# Patient Record
Sex: FEMALE | Race: WHITE | NOT HISPANIC OR LATINO | Employment: OTHER | URBAN - METROPOLITAN AREA
[De-identification: names, ages, dates, MRNs, and addresses within clinical notes are randomized per-mention and may not be internally consistent; named-entity substitution may affect disease eponyms.]

---

## 2023-06-01 LAB
ALANINE AMINOTRANSFERASE (SGPT) (U/L) IN SER/PLAS: 13 U/L (ref 7–45)
ALBUMIN (G/DL) IN SER/PLAS: 4.1 G/DL (ref 3.4–5)
ALKALINE PHOSPHATASE (U/L) IN SER/PLAS: 77 U/L (ref 33–136)
ANION GAP IN SER/PLAS: 9 MMOL/L (ref 10–20)
ASPARTATE AMINOTRANSFERASE (SGOT) (U/L) IN SER/PLAS: 18 U/L (ref 9–39)
BILIRUBIN TOTAL (MG/DL) IN SER/PLAS: 0.9 MG/DL (ref 0–1.2)
CALCIUM (MG/DL) IN SER/PLAS: 10 MG/DL (ref 8.6–10.3)
CARBON DIOXIDE, TOTAL (MMOL/L) IN SER/PLAS: 32 MMOL/L (ref 21–32)
CHLORIDE (MMOL/L) IN SER/PLAS: 104 MMOL/L (ref 98–107)
CREATININE (MG/DL) IN SER/PLAS: 0.96 MG/DL (ref 0.5–1.05)
GFR FEMALE: 58 ML/MIN/1.73M2
GLUCOSE (MG/DL) IN SER/PLAS: 83 MG/DL (ref 74–99)
POTASSIUM (MMOL/L) IN SER/PLAS: 4.2 MMOL/L (ref 3.5–5.3)
PROTEIN TOTAL: 6.8 G/DL (ref 6.4–8.2)
SODIUM (MMOL/L) IN SER/PLAS: 141 MMOL/L (ref 136–145)
THYROTROPIN (MIU/L) IN SER/PLAS BY DETECTION LIMIT <= 0.05 MIU/L: 0.41 MIU/L (ref 0.44–3.98)
THYROXINE (T4) FREE (NG/DL) IN SER/PLAS: 0.85 NG/DL (ref 0.61–1.12)
UREA NITROGEN (MG/DL) IN SER/PLAS: 21 MG/DL (ref 6–23)

## 2023-06-13 LAB — THYROID STIMULATING IMMUNOGLOBULIN: <1 TSI INDEX

## 2023-10-04 ENCOUNTER — TELEPHONE (OUTPATIENT)
Dept: ENDOCRINOLOGY | Facility: CLINIC | Age: 88
End: 2023-10-04

## 2024-07-29 ENCOUNTER — TELEPHONE (OUTPATIENT)
Dept: ENDOCRINOLOGY | Facility: CLINIC | Age: 89
End: 2024-07-29
Payer: MEDICARE

## 2024-07-29 DIAGNOSIS — E03.8 SUBCLINICAL HYPOTHYROIDISM: Primary | ICD-10-CM

## 2024-07-29 NOTE — TELEPHONE ENCOUNTER
Stoney has an appt. 8-6-24 and would like to do her labs before.  Can you please put them in her chart.  Thank you

## 2024-07-31 ENCOUNTER — LAB (OUTPATIENT)
Dept: LAB | Facility: LAB | Age: 89
End: 2024-07-31
Payer: MEDICARE

## 2024-07-31 DIAGNOSIS — E03.8 SUBCLINICAL HYPOTHYROIDISM: ICD-10-CM

## 2024-07-31 LAB
T4 FREE SERPL-MCNC: 0.89 NG/DL (ref 0.61–1.12)
TSH SERPL-ACNC: 0.45 MIU/L (ref 0.44–3.98)

## 2024-07-31 PROCEDURE — 84443 ASSAY THYROID STIM HORMONE: CPT

## 2024-07-31 PROCEDURE — 36415 COLL VENOUS BLD VENIPUNCTURE: CPT

## 2024-07-31 PROCEDURE — 84439 ASSAY OF FREE THYROXINE: CPT

## 2024-08-06 ENCOUNTER — APPOINTMENT (OUTPATIENT)
Dept: ENDOCRINOLOGY | Facility: CLINIC | Age: 89
End: 2024-08-06
Payer: MEDICARE

## 2024-08-06 VITALS — SYSTOLIC BLOOD PRESSURE: 150 MMHG | BODY MASS INDEX: 25.51 KG/M2 | DIASTOLIC BLOOD PRESSURE: 62 MMHG | WEIGHT: 144 LBS

## 2024-08-06 DIAGNOSIS — E05.20 TOXIC ADENOMATOUS GOITER: Primary | ICD-10-CM

## 2024-08-06 DIAGNOSIS — E78.5 HYPERLIPIDEMIA, UNSPECIFIED HYPERLIPIDEMIA TYPE: ICD-10-CM

## 2024-08-06 PROCEDURE — 1159F MED LIST DOCD IN RCRD: CPT | Performed by: HOSPITALIST

## 2024-08-06 PROCEDURE — 99214 OFFICE O/P EST MOD 30 MIN: CPT | Performed by: HOSPITALIST

## 2024-08-06 RX ORDER — PRAVASTATIN SODIUM 40 MG/1
TABLET ORAL
COMMUNITY
Start: 2018-07-13

## 2024-08-06 RX ORDER — POTASSIUM CHLORIDE 600 MG/1
TABLET, FILM COATED, EXTENDED RELEASE ORAL
COMMUNITY
Start: 2018-07-13

## 2024-08-06 RX ORDER — METOPROLOL TARTRATE 50 MG/1
TABLET ORAL
COMMUNITY
Start: 2018-07-13

## 2024-08-06 RX ORDER — LOSARTAN POTASSIUM AND HYDROCHLOROTHIAZIDE 12.5; 1 MG/1; MG/1
TABLET ORAL
COMMUNITY
Start: 2018-07-13

## 2024-08-06 ASSESSMENT — ENCOUNTER SYMPTOMS
NAUSEA: 0
ABDOMINAL DISTENTION: 0
PALPITATIONS: 0
VOMITING: 0
TREMORS: 0
EYE ITCHING: 0
CONSTIPATION: 0
ARTHRALGIAS: 0
DYSURIA: 0
ABDOMINAL PAIN: 0
CHEST TIGHTNESS: 0
LIGHT-HEADEDNESS: 0
TROUBLE SWALLOWING: 0
FREQUENCY: 0
HEADACHES: 0
SHORTNESS OF BREATH: 0
NERVOUS/ANXIOUS: 0
SORE THROAT: 0
VOICE CHANGE: 0
PHOTOPHOBIA: 0
SLEEP DISTURBANCE: 0
AGITATION: 0
DIARRHEA: 0
CONSTITUTIONAL NEGATIVE: 1

## 2024-08-06 NOTE — PROGRESS NOTES
Subjective   Patient ID: Kari De Paz is a 88 y.o. female who presents for Thyroid Problem (Scan 9/2023 , labs done 7/31/24, free thyroxine 0.89).   Lab Results   Component Value Date    TSH 0.45 07/31/2024      HPI   See AP     Review of Systems   Constitutional: Negative.    HENT:  Negative for sore throat, trouble swallowing and voice change.    Eyes:  Negative for photophobia, itching and visual disturbance.   Respiratory:  Negative for chest tightness and shortness of breath.    Cardiovascular:  Negative for chest pain and palpitations.   Gastrointestinal:  Negative for abdominal distention, abdominal pain, constipation, diarrhea, nausea and vomiting.   Endocrine: Negative for cold intolerance, heat intolerance and polyuria.   Genitourinary:  Negative for dysuria and frequency.   Musculoskeletal:  Negative for arthralgias.   Skin:  Negative for pallor.   Allergic/Immunologic: Negative for environmental allergies.   Neurological:  Negative for tremors, light-headedness and headaches.   Psychiatric/Behavioral:  Negative for agitation and sleep disturbance. The patient is not nervous/anxious.        Objective   Physical Exam  Constitutional:       Appearance: Normal appearance.   HENT:      Head: Normocephalic.      Nose: Nose normal.      Mouth/Throat:      Mouth: Mucous membranes are moist.   Eyes:      Extraocular Movements: Extraocular movements intact.   Neck:      Comments: Thyroid ~ 15gm , 2 cm ~ nodule left  Cardiovascular:      Rate and Rhythm: Normal rate.   Pulmonary:      Effort: Pulmonary effort is normal. No respiratory distress.   Abdominal:      General: There is no distension.   Musculoskeletal:         General: Normal range of motion.      Cervical back: Normal range of motion and neck supple.   Skin:     General: Skin is warm and dry.   Neurological:      Mental Status: She is alert and oriented to person, place, and time.   Psychiatric:         Mood and Affect: Mood normal.       Visit  "Vitals  /62   Wt 65.3 kg (144 lb)   BMI 25.51 kg/m²   BSA 1.7 m²        Assessment/Plan   Diagnoses and all orders for this visit:  Toxic adenomatous goiter  Hyperlipidemia, unspecified hyperlipidemia type     SUBCLINICAL HYPERTHYROIDISM / Toxic multinodular goiter   she was following with Dr Sweeney in past   all her [facility] records in past were reviewed as well    1/19/23- TSH 0.22, free T4 was 1.15 m in july 2022- free T4 was 0.98 TSH was 0.26    TSI neg   7/15/23- TSH 0.48 free T4 was 0.9 - stopped biotin for a week    7/31/24- TSH 0.45 Free T4 was 0.89  she has no symptoms of hyperthyroidism.  clinically euthyroid on exam      PLAN :   BUT no indication for Rx , no cardiac issues, DEXA scan feb 2023 - was normal and she is asymptomatic , free T4 normal   her TSH has been suppressed at least since 2015 as per labs review   she has no signs and symptoms of hyperthyroidism      # MNG :   on exam palpated ~ 2 cm nodule left lobe near isthmus   US thyroid - 2/ 2023- mildly suspicious nodule 34 mm on left and 19 mm on right , 3 nodules in total   FNA was recommened but given her subclinical hyperthyroidism ,   we did an uptake scan which showed left \" hot nodule\" confirming toxic adenoma   Clinically euthyroid    Will hold off repeating imaging at this time         RTc 1 yr     [facility] breast cancer - 2011 - s/p surgery and radiation, denied any chemo     Sh- she has one daughter who passed away in past with cancer    her  has prostate issues passed away in past   2 GD , one is a nurse now at [employer] , taking NP classes  She has one GGD- Josep- 3 yo   she does exercise class In [employer] once a  week before,  she does resistance exercises      [facility] PSH reviewed    FH- mom had thyroid issues she was almost 89 yo.          "

## 2025-08-19 ENCOUNTER — APPOINTMENT (OUTPATIENT)
Dept: ENDOCRINOLOGY | Facility: CLINIC | Age: 89
End: 2025-08-19
Payer: MEDICARE

## 2025-08-19 VITALS
WEIGHT: 144 LBS | DIASTOLIC BLOOD PRESSURE: 62 MMHG | SYSTOLIC BLOOD PRESSURE: 148 MMHG | HEIGHT: 62 IN | BODY MASS INDEX: 26.5 KG/M2

## 2025-08-19 DIAGNOSIS — E05.20 TOXIC ADENOMATOUS GOITER: Primary | ICD-10-CM

## 2025-08-19 PROCEDURE — 99214 OFFICE O/P EST MOD 30 MIN: CPT | Performed by: HOSPITALIST

## 2025-08-19 ASSESSMENT — ENCOUNTER SYMPTOMS
PHOTOPHOBIA: 0
TROUBLE SWALLOWING: 0
LIGHT-HEADEDNESS: 0
NAUSEA: 0
ABDOMINAL DISTENTION: 0
DYSURIA: 0
VOMITING: 0
SHORTNESS OF BREATH: 0
AGITATION: 0
NERVOUS/ANXIOUS: 0
PALPITATIONS: 0
SORE THROAT: 0
CHEST TIGHTNESS: 0
EYE ITCHING: 0
FREQUENCY: 0
ARTHRALGIAS: 0
CONSTIPATION: 0
HEADACHES: 0
VOICE CHANGE: 0
SLEEP DISTURBANCE: 0
TREMORS: 0
ABDOMINAL PAIN: 0
CONSTITUTIONAL NEGATIVE: 1
DIARRHEA: 0

## 2026-08-25 ENCOUNTER — APPOINTMENT (OUTPATIENT)
Dept: ENDOCRINOLOGY | Facility: CLINIC | Age: 89
End: 2026-08-25
Payer: MEDICARE